# Patient Record
Sex: FEMALE | Race: WHITE
[De-identification: names, ages, dates, MRNs, and addresses within clinical notes are randomized per-mention and may not be internally consistent; named-entity substitution may affect disease eponyms.]

---

## 2018-04-14 ENCOUNTER — HOSPITAL ENCOUNTER (EMERGENCY)
Dept: HOSPITAL 45 - C.EDB | Age: 63
Discharge: HOME | End: 2018-04-14
Payer: COMMERCIAL

## 2018-04-14 VITALS
BODY MASS INDEX: 38.83 KG/M2 | HEIGHT: 65.98 IN | HEIGHT: 65.98 IN | WEIGHT: 241.63 LBS | WEIGHT: 241.63 LBS | BODY MASS INDEX: 38.83 KG/M2

## 2018-04-14 VITALS — SYSTOLIC BLOOD PRESSURE: 165 MMHG | DIASTOLIC BLOOD PRESSURE: 92 MMHG | OXYGEN SATURATION: 99 % | HEART RATE: 65 BPM

## 2018-04-14 VITALS — TEMPERATURE: 98.24 F

## 2018-04-14 DIAGNOSIS — Z79.899: ICD-10-CM

## 2018-04-14 DIAGNOSIS — Z88.8: ICD-10-CM

## 2018-04-14 DIAGNOSIS — Z86.718: ICD-10-CM

## 2018-04-14 DIAGNOSIS — R60.0: Primary | ICD-10-CM

## 2018-04-14 DIAGNOSIS — Z79.82: ICD-10-CM

## 2018-04-14 DIAGNOSIS — R06.00: ICD-10-CM

## 2018-04-14 LAB
BASOPHILS # BLD: 0.01 K/UL (ref 0–0.2)
BASOPHILS NFR BLD: 0.2 %
BUN SERPL-MCNC: 13 MG/DL (ref 7–18)
CALCIUM SERPL-MCNC: 8.1 MG/DL (ref 8.5–10.1)
CK MB SERPL-MCNC: 1.6 NG/ML (ref 0.5–3.6)
CO2 SERPL-SCNC: 24 MMOL/L (ref 21–32)
CREAT SERPL-MCNC: 1.02 MG/DL (ref 0.6–1.2)
EOS ABS #: 0.05 K/UL (ref 0–0.5)
EOSINOPHIL NFR BLD AUTO: 193 K/UL (ref 130–400)
GLUCOSE SERPL-MCNC: 90 MG/DL (ref 70–99)
HCT VFR BLD CALC: 38.8 % (ref 37–47)
HGB BLD-MCNC: 12.9 G/DL (ref 12–16)
IG#: 0.02 K/UL (ref 0–0.02)
IMM GRANULOCYTES NFR BLD AUTO: 26.3 %
INR PPP: 1 (ref 0.9–1.1)
LYMPHOCYTES # BLD: 1.41 K/UL (ref 1.2–3.4)
MCH RBC QN AUTO: 31.9 PG (ref 25–34)
MCHC RBC AUTO-ENTMCNC: 33.2 G/DL (ref 32–36)
MCV RBC AUTO: 96 FL (ref 80–100)
MONO ABS #: 0.65 K/UL (ref 0.11–0.59)
MONOCYTES NFR BLD: 12.1 %
NEUT ABS #: 3.23 K/UL (ref 1.4–6.5)
NEUTROPHILS # BLD AUTO: 0.9 %
NEUTROPHILS NFR BLD AUTO: 60.1 %
PMV BLD AUTO: 10.5 FL (ref 7.4–10.4)
POTASSIUM SERPL-SCNC: 3.6 MMOL/L (ref 3.5–5.1)
PTT PATIENT: 25.4 SECONDS (ref 21–31)
RED CELL DISTRIBUTION WIDTH CV: 12.8 % (ref 11.5–14.5)
RED CELL DISTRIBUTION WIDTH SD: 44.7 FL (ref 36.4–46.3)
SODIUM SERPL-SCNC: 141 MMOL/L (ref 136–145)
WBC # BLD AUTO: 5.37 K/UL (ref 4.8–10.8)

## 2018-04-14 NOTE — DIAGNOSTIC IMAGING REPORT
CT ANGIOGRAM OF THE CHEST



CLINICAL HISTORY: Fever, sepsis, shortness of breath.    



COMPARISON STUDY:  1/13/2015 



TECHNIQUE: Following the IV administration of 94 mL of Optiray-320, CT angiogram

of the thorax was performed from the thoracic inlet to the lung bases utilizing

the pulmonary embolus protocol. Images are reviewed in the axial, sagittal, and

coronal planes. IV contrast was administered without complication. MIP imaging

was performed.  A dose lowering technique was utilized adhering to the

principles of ALARA.





CT DOSE: 685.05 mGy.cm



FINDINGS:



There is hepatic steatosis.



Mediastinal and hilar lymph nodes are the upper limits of normal in size



There was no evidence of thoracic aortic dilatation.



There is a tiny right lower lobe pulmonary artery filling defect. A similar but

larger filling defect was present on the 2015 study. It is therefore difficult

to determine whether this is acute or chronic.



No pleural effusions are visualized.



There is respiratory motion artifact. There are dependent atelectatic changes.

There is mild lower lobe bronchial wall thickening. There is no lobar

consolidation. There is a calcified right upper lobe granuloma. There is a

noncalcified 3 mm right upper lobe pulmonary nodule as visualized in image

#217/[id number]. This measured 2 mm in 2015. There is a 2.5 mm left lower lobe point

nodule as visualized in image #138/288. Also evident is a 2.5 mm left lower

pulmonary nodule as visualized in image #119/288.



IMPRESSION:  

1. Tiny right lower lobe pulmonary artery embolus. This is smaller than on the

prior January 2015 study, and is therefore age-indeterminate. Correlation with

leg ultrasonography would seem prudent.

2. Hepatic steatosis

3. Scattered tiny pulmonary nodules. In a low risk patient, no further follow-up

is indicated. In a high risk patient, a 12 month follow-up is optional.



Please refer to below summary of Fleischner criteria recommendations for

follow-up of incidental CT nodules (CLEVE Wilheml, Guidelines for management of

small pulmonary nodules detected on CT scans:  A statement from the Fleischner

Society, Radiology 237: [phone].)



SOLID NODULES



Solitary nodule size: <6 mm

*  low risk patients:  no follow-up needed

*  high risk patients:  optional CT at 12 months



Solitary nodule size:  6-8 mm

*  low risk patients:  follow-up at 6-12 months, then consider further follow-up

at 18-24 months

*  high risk patients:  initial follow-up CT at 6-12 months and then at 18-24

months if no change



Solitary nodule size: >8 mm

*  either low or high risk patients - consider follow-up CT at 3 months, and/or

CT-PET, and/or biopsy



Multiple nodules size:  <6 mm

*  low risk patients:  no routine follow-up

*  high risk patients:  optional CT at 12 months



Multiple nodules size:  6-8 mm

*  low risk patients:  follow-up at 3-6 months, then consider further follow-up

at 18-24 months

*  high risk patients:  follow-up at 3-6 months, then at 18-24 months if no

change



Multiple nodules size:  >8 mm

*  low risk patients:  follow-up at 3-6 months, then consider further follow-up

at 18-24 months

*  high risk patients:  follow-up at 3-6 months, then at 18-24 months if no

change



Note:  newly detected indeterminate nodule in persons 35 years of age or older.

*  low risk patients:  minimal or absent history of smoking and/or other known

risk factors

*  high risk patients:  history of smoking or of other known risk factors (e.g.

first degree relative with lung cancer, or exposure to asbestos, radon, uranium)

*  if a nodule up to 8 mm is partly solid or is ground glass further follow-up

is required after 24 months to exclude possible slow growing adenocarcinoma

([facility]ELSI)



SUBSOLID NODULES



Solitary pure ground-glass nodule

*  nodule size <6 mm - no CT follow-up required

*  nodule size >=6 mm - follow-up CT at 6-12 months, then every 2 years until 5

years



Solitary part-solid nodule

*  nodule size <6 mm - no CT follow-up required

*  nodule size >=6 mm - follow-up CT at 3-6 months.  If unchanged, and solid

component remains <6 mm, then annual follow-up for 5 years



Multiple subsolid nodules

*  nodule size <6 mm - follow-up CT at 3-6 months, consider further follow-up at

2 and 4 years if stable

*  nodule size >=6 mm - follow-up CT at 3-6 months, subsequent management based

on the most suspicious nodule(s)







       







Electronically signed by:  Gildardo Waller M.D.

4/14/2018 10:17 AM



Dictated Date/Time:  4/14/2018 10:07 AM

## 2018-04-14 NOTE — DIAGNOSTIC IMAGING REPORT
CHEST ONE VIEW PORTABLE



CLINICAL HISTORY: Fever, sepsis, shortness of breath.    



COMPARISON STUDY:  4/13/2018



FINDINGS: The cardiac and mediastinal contours are normal. There is no evidence

of focal pulmonary consolidation. There is no evidence of failure. No pleural

effusions are visualized.[ Slight prominence of the basilar markings, are likely

secondary to atelectatic changes



IMPRESSION: No active disease in the chest.







Electronically signed by:  Gildardo Waller M.D.

4/14/2018 9:37 AM



Dictated Date/Time:  4/14/2018 9:36 AM

## 2018-04-14 NOTE — DIAGNOSTIC IMAGING REPORT
ULTRASOUND VENOUS DOPPLER LWR EXT BILA 



CLINICAL HISTORY: Age-indeterminate pulmonary embolus    



COMPARISON STUDY:  5/4/2015 



FINDINGS: Real-time and color flow Doppler imaging were performed. Flow was seen

within the femoral, popliteal and calf veins with no intraluminal thrombus

demonstrated. The saphenous vein is patent.



IMPRESSION: No evidence of deep venous thrombosis.







Electronically signed by:  Gildardo Waller M.D.

4/14/2018 11:31 AM



Dictated Date/Time:  4/14/2018 11:30 AM